# Patient Record
Sex: FEMALE | Race: WHITE | NOT HISPANIC OR LATINO | Employment: FULL TIME | URBAN - METROPOLITAN AREA
[De-identification: names, ages, dates, MRNs, and addresses within clinical notes are randomized per-mention and may not be internally consistent; named-entity substitution may affect disease eponyms.]

---

## 2024-10-30 ENCOUNTER — OFFICE VISIT (OUTPATIENT)
Dept: OBSTETRICS AND GYNECOLOGY | Facility: CLINIC | Age: 24
End: 2024-10-30
Payer: COMMERCIAL

## 2024-10-30 VITALS — WEIGHT: 213.8 LBS | BODY MASS INDEX: 32.51 KG/M2 | DIASTOLIC BLOOD PRESSURE: 84 MMHG | SYSTOLIC BLOOD PRESSURE: 125 MMHG

## 2024-10-30 DIAGNOSIS — Z01.419 ENCOUNTER FOR GYNECOLOGICAL EXAMINATION WITHOUT ABNORMAL FINDING: Primary | ICD-10-CM

## 2024-10-30 DIAGNOSIS — Z11.3 ROUTINE SCREENING FOR STI (SEXUALLY TRANSMITTED INFECTION): ICD-10-CM

## 2024-10-30 DIAGNOSIS — Z30.41 ORAL CONTRACEPTIVE PILL SURVEILLANCE: ICD-10-CM

## 2024-10-30 PROCEDURE — 1036F TOBACCO NON-USER: CPT | Performed by: ADVANCED PRACTICE MIDWIFE

## 2024-10-30 PROCEDURE — 99385 PREV VISIT NEW AGE 18-39: CPT | Performed by: ADVANCED PRACTICE MIDWIFE

## 2024-10-30 PROCEDURE — 87661 TRICHOMONAS VAGINALIS AMPLIF: CPT | Performed by: ADVANCED PRACTICE MIDWIFE

## 2024-10-30 PROCEDURE — 87591 N.GONORRHOEAE DNA AMP PROB: CPT | Performed by: ADVANCED PRACTICE MIDWIFE

## 2024-10-30 RX ORDER — TIMOLOL MALEATE 5 MG/ML
1 SOLUTION/ DROPS OPHTHALMIC DAILY
COMMUNITY
End: 2024-10-30 | Stop reason: SDUPTHER

## 2024-10-30 RX ORDER — TIMOLOL MALEATE 5 MG/ML
1 SOLUTION/ DROPS OPHTHALMIC DAILY
Qty: 84 TABLET | Refills: 4 | Status: SHIPPED | OUTPATIENT
Start: 2024-10-30

## 2024-10-30 RX ORDER — VALACYCLOVIR HYDROCHLORIDE 1 G/1
500 TABLET, FILM COATED ORAL DAILY
COMMUNITY

## 2024-10-30 ASSESSMENT — PAIN SCALES - GENERAL: PAINLEVEL_OUTOF10: 0-NO PAIN

## 2024-10-31 LAB
C TRACH RRNA SPEC QL NAA+PROBE: NEGATIVE
N GONORRHOEA DNA SPEC QL PROBE+SIG AMP: NEGATIVE
T VAGINALIS RRNA SPEC QL NAA+PROBE: NEGATIVE

## 2024-11-12 LAB
CYTOLOGY CMNT CVX/VAG CYTO-IMP: NORMAL
LAB AP CONTRACEPTIVE HISTORY: NORMAL
LAB AP HPV GENOTYPE QUESTION: NO
LAB AP HPV HR: NORMAL
LAB AP PAP ADDITIONAL TESTS: NORMAL
LABORATORY COMMENT REPORT: NORMAL
LMP START DATE: NORMAL
PATH REPORT.TOTAL CANCER: NORMAL

## 2025-07-21 ENCOUNTER — APPOINTMENT (OUTPATIENT)
Dept: PRIMARY CARE | Facility: HOSPITAL | Age: 25
End: 2025-07-21
Payer: COMMERCIAL

## 2025-07-28 ENCOUNTER — APPOINTMENT (OUTPATIENT)
Dept: PRIMARY CARE | Facility: HOSPITAL | Age: 25
End: 2025-07-28
Payer: COMMERCIAL

## 2025-07-28 VITALS
BODY MASS INDEX: 32.46 KG/M2 | OXYGEN SATURATION: 97 % | HEIGHT: 68 IN | DIASTOLIC BLOOD PRESSURE: 90 MMHG | TEMPERATURE: 95.4 F | HEART RATE: 85 BPM | WEIGHT: 214.2 LBS | SYSTOLIC BLOOD PRESSURE: 128 MMHG

## 2025-07-28 DIAGNOSIS — F32.A DEPRESSION, UNSPECIFIED DEPRESSION TYPE: ICD-10-CM

## 2025-07-28 DIAGNOSIS — Z00.00 ENCOUNTER FOR GENERAL ADULT MEDICAL EXAMINATION WITHOUT ABNORMAL FINDINGS: Primary | ICD-10-CM

## 2025-07-28 DIAGNOSIS — B00.9 HERPES SIMPLEX: ICD-10-CM

## 2025-07-28 DIAGNOSIS — Z23 NEED FOR TDAP VACCINATION: ICD-10-CM

## 2025-07-28 PROCEDURE — 90715 TDAP VACCINE 7 YRS/> IM: CPT | Mod: GC

## 2025-07-28 PROCEDURE — 3008F BODY MASS INDEX DOCD: CPT

## 2025-07-28 PROCEDURE — 99385 PREV VISIT NEW AGE 18-39: CPT

## 2025-07-28 PROCEDURE — 99385 PREV VISIT NEW AGE 18-39: CPT | Mod: GC,25

## 2025-07-28 RX ORDER — BUPROPION HYDROCHLORIDE 150 MG/1
150 TABLET ORAL EVERY MORNING
Qty: 30 TABLET | Refills: 5 | Status: SHIPPED | OUTPATIENT
Start: 2025-07-28 | End: 2026-01-24

## 2025-07-28 RX ORDER — CYANOCOBALAMIN (VITAMIN B-12) 500 MCG
2 TABLET ORAL NIGHTLY PRN
COMMUNITY

## 2025-07-28 RX ORDER — VALACYCLOVIR HYDROCHLORIDE 1 G/1
2000 TABLET, FILM COATED ORAL 2 TIMES DAILY
Qty: 120 TABLET | Refills: 3 | Status: SHIPPED | OUTPATIENT
Start: 2025-07-28

## 2025-07-28 ASSESSMENT — ENCOUNTER SYMPTOMS
OCCASIONAL FEELINGS OF UNSTEADINESS: 0
DEPRESSION: 0
LOSS OF SENSATION IN FEET: 0

## 2025-07-28 ASSESSMENT — PATIENT HEALTH QUESTIONNAIRE - PHQ9
SUM OF ALL RESPONSES TO PHQ9 QUESTIONS 1 AND 2: 0
2. FEELING DOWN, DEPRESSED OR HOPELESS: NOT AT ALL
1. LITTLE INTEREST OR PLEASURE IN DOING THINGS: NOT AT ALL

## 2025-07-28 NOTE — PATIENT INSTRUCTIONS
It was great meeting you Tiffany!    Please get your labs done at your earliest convenience, we will call with any abnormal results.    We are also starting Wellbutrin - keep an eye on your mood and any anxiety, and reach out if you are noticing any negative side effects.    We will see you back in 3 months to check in!

## 2025-07-28 NOTE — PROGRESS NOTES
"Tiffany Boyle is a 24 y.o. without notable past medical history seen in Clinic on 07/28/25 to establish care.     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 Very low energy, low motivation. Some anhedonia. Sister on a mental health medication. Interested in wellbutrin. More anxiety related to work, hard time sleeping with thoughts running through her mind. Previously on propranolol for testing anxiety.  - Start Wellbutrin   - Referral to therapy    Chronic Medical Conditions:   #Intermittent cold sores, on PRN Valtrex  No recent flares.  - Valtrex PRN refilled    #HM  #Clinical work  - Tdap booster today  - T-Spot  - CBCd, CMP, mag, lipids, TSH, vitamin D, A1c  - HIV, HCV, and syphilis screening     ROS:   Negative except as noted.    Past Medical History: None  Medical History[1]    Past Surgical History: [place] teeth  Surgical History[2]    Medications:   Vienva daily  Melatonin PRN  PRN Valtrex  Current Medications[3]  Pharmacy:   [facility] in [address]    Allergies: NKDA  Allergies[4]      Immunizations: Needs tdap    Family History: PGM with breast cancer, older when diagnosed, no other cancers  Family History[5]    Social History:   Home/Living Situation/Falls/Safety Assessment: Lives with parents in [facility] [place], engaged, fiance in [place]  Education/Employment/Work/Vocational: 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[employer], 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  Drug Use: EtOH, socially, no other use  Depression/Anxiety: Discussed above  Sexuality/Contraception/Menstrual History: Vienva    Visit Vitals  /90 (BP Location: Left arm, Patient Position: Sitting, BP Cuff Size: Adult)   Pulse 85   Temp 35.2 °C (95.4 °F) (Temporal)   Ht 1.727 m (5' 8\")   Wt 97.2 kg (214 lb 3.2 oz)   SpO2 97%   BMI 32.57 kg/m²   OB Status Having periods   Smoking Status Never   BSA 2.16 m²        PHYSICAL EXAM:    General: well appearing, NAD, pleasant and engaged in encounter    HEENT: " NCAT, MMM  CV: RRR, no m/r/g  PULM: CTAB, non-labored respirations   ABD: soft, NT, ND, + bowel sounds   : no suprapubic or CVA tenderness   EXT: WWP, no significant edema   SKIN: no rashes noted   NEURO: A&Ox4, symmetric facies, no gross motor or sensory deficits, normal gait  PSYCH: pleasant mood, appropriate affect     Assessment/Plan    Tiffany Boyle is a 24 y.o. without notable past medical history seen in Clinic on 07/28/25 to establish care. Overall doing well - most active issue today is concern for depression. Through shared decision making, will begin Wellbutrin, and refer to therapy. Will also obtain baseline screening labs for healthcare maintenance. Will keep an eye on BP while initiating Wellbutrin.     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 Very low energy, low motivation. Some anhedonia. Sister on a mental health medication. Interested in wellbutrin. More anxiety related to work, hard time sleeping with thoughts running through her mind. Previously on propranolol for testing anxiety.  - Start Wellbutrin    > CTM BP, 128/90 in clinic today, 130s SBP recorded at previous visits  - Referral to therapy    Chronic Medical Conditions:   #Intermittent cold sores, on PRN Valtrex  No recent flares.  - Valtrex PRN refilled    #HM  #Clinical work  - Tdap booster today    - T-Spot  - CBCd, CMP, mag, lipids, TSH, vitamin D, A1c  - HIV, HCV, and syphilis screening     Cancer Screening  - Cervical Cancer Screening: last pap smear/HPV testing - October 2025  - Mammography: Not yet due  - Colorectal Cancer Screening: Not yet due    Laboratory Screening  - Lipid Screen: Ordered today  - ASCVD Score: Age precludes   - A1C, glucose screen: Ordered today  - STI, HIV, Hep B screen: Ordered today  - Hep C screen: Ordered today    Imaging Screening  - Osteoporosis/DEXA screening: Not yet due    Immunizations:    - Influenza UTD  - COVID  UTD  - Tdap today  - Menactra, Men B UTD  - HPV UTD  - Prevnar, Pneumovax UTD  - Shingrix not yet due    Other Screening  - Health Literacy Assessment: Excellent  - Home safety/partner violence screen: Unremarkable  - Hearing/Vision screens: UTD  - Alcohol/tobacco/drug use screen: UTD    Referrals: Psychology  Return to clinic in 3 months for follow-up.    Patient Discussion:    Please call back the office with any questions. In the case of an emergency, please call 911 or go to the nearest Emergency Department.      Rosa Medrano MD  Internal Medicine and Pediatrics, PGY-3  [facility]          [1]   Past Medical History:  Diagnosis Date    Other specified health status     No pertinent past medical history   [2]   Past Surgical History:  Procedure Laterality Date    OTHER SURGICAL HISTORY  06/27/2019    [place] tooth extraction   [3]   Current Outpatient Medications:     valACYclovir (Valtrex) 1 gram tablet, Take 0.5 tablets (500 mg) by mouth once daily., Disp: , Rfl:     Vienva 0.1-20 mg-mcg tablet, Take 1 tablet by mouth once daily. as directed, Disp: 84 tablet, Rfl: 4  [4] No Known Allergies  [5]   Family History  Problem Relation Name Age of Onset    Breast cancer Paternal Grandmother      Heart attack Paternal Grandfather      Ovarian cancer Neg Hx      Colon cancer Neg Hx      Asthma Mother  10 - 19

## 2025-07-31 LAB
25(OH)D3+25(OH)D2 SERPL-MCNC: 37 NG/ML (ref 30–100)
ALBUMIN SERPL-MCNC: 4.7 G/DL (ref 3.6–5.1)
ALP SERPL-CCNC: 38 U/L (ref 31–125)
ALT SERPL-CCNC: 13 U/L (ref 6–29)
ANION GAP SERPL CALCULATED.4IONS-SCNC: 10 MMOL/L (CALC) (ref 7–17)
AST SERPL-CCNC: 16 U/L (ref 10–30)
BASOPHILS # BLD AUTO: 70 CELLS/UL (ref 0–200)
BASOPHILS NFR BLD AUTO: 0.8 %
BILIRUB SERPL-MCNC: 0.6 MG/DL (ref 0.2–1.2)
BUN SERPL-MCNC: 12 MG/DL (ref 7–25)
CALCIUM SERPL-MCNC: 9.5 MG/DL (ref 8.6–10.2)
CHLORIDE SERPL-SCNC: 105 MMOL/L (ref 98–110)
CHOLEST SERPL-MCNC: 186 MG/DL
CHOLEST/HDLC SERPL: 3.2 (CALC)
CO2 SERPL-SCNC: 21 MMOL/L (ref 20–32)
CREAT SERPL-MCNC: 0.84 MG/DL (ref 0.5–0.96)
EGFRCR SERPLBLD CKD-EPI 2021: 99 ML/MIN/1.73M2
EOSINOPHIL # BLD AUTO: 484 CELLS/UL (ref 15–500)
EOSINOPHIL NFR BLD AUTO: 5.5 %
ERYTHROCYTE [DISTWIDTH] IN BLOOD BY AUTOMATED COUNT: 11.7 % (ref 11–15)
EST. AVERAGE GLUCOSE BLD GHB EST-MCNC: 94 MG/DL
EST. AVERAGE GLUCOSE BLD GHB EST-SCNC: 5.2 MMOL/L
GLUCOSE SERPL-MCNC: 86 MG/DL (ref 65–99)
HBA1C MFR BLD: 4.9 %
HCT VFR BLD AUTO: 43.4 % (ref 35–45)
HCV AB SERPL QL IA: NORMAL
HDLC SERPL-MCNC: 59 MG/DL
HGB BLD-MCNC: 14.2 G/DL (ref 11.7–15.5)
HIV 1+2 AB+HIV1 P24 AG SERPL QL IA: NORMAL
HIV 1+2 AB+HIV1 P24 AG SERPL QL IA: NORMAL
IGNF NEG CNTRL BLD: NORMAL
LDLC SERPL CALC-MCNC: 96 MG/DL (CALC)
LYMPHOCYTES # BLD AUTO: 3124 CELLS/UL (ref 850–3900)
LYMPHOCYTES NFR BLD AUTO: 35.5 %
M TB IFN-G BLD-IMP: NEGATIVE
MCH RBC QN AUTO: 31.1 PG (ref 27–33)
MCHC RBC AUTO-ENTMCNC: 32.7 G/DL (ref 32–36)
MCV RBC AUTO: 95 FL (ref 80–100)
MITOGEN IGNF.SPOT COUNT BLD: NORMAL
MONOCYTES # BLD AUTO: 326 CELLS/UL (ref 200–950)
MONOCYTES NFR BLD AUTO: 3.7 %
NEUTROPHILS # BLD AUTO: 4796 CELLS/UL (ref 1500–7800)
NEUTROPHILS NFR BLD AUTO: 54.5 %
NONHDLC SERPL-MCNC: 127 MG/DL (CALC)
PLATELET # BLD AUTO: 322 THOUSAND/UL (ref 140–400)
PMV BLD REES-ECKER: 10.6 FL (ref 7.5–12.5)
POTASSIUM SERPL-SCNC: 4.3 MMOL/L (ref 3.5–5.3)
PROT SERPL-MCNC: 7.4 G/DL (ref 6.1–8.1)
QUEST PANEL A SPOT COUNT: 0
QUEST PANEL B SPOT COUNT: 0
RBC # BLD AUTO: 4.57 MILLION/UL (ref 3.8–5.1)
SODIUM SERPL-SCNC: 136 MMOL/L (ref 135–146)
T PALLIDUM AB SER QL IA: NEGATIVE
TRIGL SERPL-MCNC: 226 MG/DL
TSH SERPL-ACNC: 1.97 MIU/L
WBC # BLD AUTO: 8.8 THOUSAND/UL (ref 3.8–10.8)

## 2025-08-04 DIAGNOSIS — Z00.00 ENCOUNTER FOR GENERAL ADULT MEDICAL EXAMINATION WITHOUT ABNORMAL FINDINGS: ICD-10-CM
